# Patient Record
Sex: FEMALE | Race: OTHER | NOT HISPANIC OR LATINO | URBAN - METROPOLITAN AREA
[De-identification: names, ages, dates, MRNs, and addresses within clinical notes are randomized per-mention and may not be internally consistent; named-entity substitution may affect disease eponyms.]

---

## 2017-10-25 ENCOUNTER — EMERGENCY (EMERGENCY)
Facility: HOSPITAL | Age: 67
LOS: 1 days | Discharge: HOME | End: 2017-10-25
Attending: EMERGENCY MEDICINE | Admitting: EMERGENCY MEDICINE
Payer: SELF-PAY

## 2017-10-25 VITALS
TEMPERATURE: 98 F | SYSTOLIC BLOOD PRESSURE: 179 MMHG | DIASTOLIC BLOOD PRESSURE: 119 MMHG | WEIGHT: 190.04 LBS | OXYGEN SATURATION: 98 % | HEIGHT: 69 IN | HEART RATE: 115 BPM | RESPIRATION RATE: 19 BRPM

## 2017-10-25 VITALS
HEART RATE: 109 BPM | SYSTOLIC BLOOD PRESSURE: 151 MMHG | TEMPERATURE: 98 F | OXYGEN SATURATION: 98 % | RESPIRATION RATE: 18 BRPM | DIASTOLIC BLOOD PRESSURE: 96 MMHG
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
## 2017-10-25 DIAGNOSIS — Z90.49 ACQUIRED ABSENCE OF OTHER SPECIFIED PARTS OF DIGESTIVE TRACT: Chronic | ICD-10-CM

## 2017-10-25 DIAGNOSIS — S00.93XA CONTUSION OF UNSPECIFIED PART OF HEAD, INITIAL ENCOUNTER: ICD-10-CM

## 2017-10-25 DIAGNOSIS — R51 HEADACHE: ICD-10-CM
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
## 2017-10-25 DIAGNOSIS — Z90.89 ACQUIRED ABSENCE OF OTHER ORGANS: ICD-10-CM

## 2017-10-25 PROCEDURE — 70450 CT HEAD/BRAIN W/O DYE: CPT | Mod: 26

## 2017-10-25 PROCEDURE — 70450 CT HEAD/BRAIN W/O DYE: CPT

## 2017-10-25 PROCEDURE — 99284 EMERGENCY DEPT VISIT MOD MDM: CPT | Mod: 25

## 2017-10-25 PROCEDURE — 93010 ELECTROCARDIOGRAM REPORT: CPT

## 2017-10-25 RX ORDER — ACETAMINOPHEN 500 MG
650 TABLET ORAL ONCE
Qty: 0 | Refills: 0 | Status: COMPLETED | OUTPATIENT
Start: 2017-10-25 | End: 2017-10-25

## 2017-10-25 RX ADMIN — Medication 650 MILLIGRAM(S): at 17:16

## 2017-10-25 RX ADMIN — Medication 650 MILLIGRAM(S): at 18:07

## 2017-10-25 NOTE — ED PROVIDER NOTE - ATTENDING CONTRIBUTION TO CARE
Pt hx of anxiety s/p fall, was kneeling down, lost balance then hit her head. no loc, no neck pain/facial pain, ambulating after, no other injury. NAD, RRR, CTAB, no abdominal tenderness, no focal neuro deficits, no midline c/t/l spine tenderness. CT head obtained, wnl, Discussed with pt results of work up, strict return precautions, and need for follow up.  Pt expressed understanding and agrees with plan.

## 2017-10-25 NOTE — ED PROVIDER NOTE - PHYSICAL EXAMINATION
CONSTITUTIONAL: Well-appearing; anxious  HEAD: Normocephalic; +tenderness to L parietal area, minor swelling, no abrasions or laceration  EYES: PERRL; EOM intact; conjunctiva and sclera clear  ENT: normal nose; no rhinorrhea; normal pharynx with no erythema or lesions.   NECK: Supple; non-tender; no LAD, no midline tenderness   CARDIOVASCULAR: Normal S1, S2; no murmurs, rubs, or gallops. Regular rate and rhythm.   RESPIRATORY: Breathing easily; breath sounds clear and equal bilaterally; no wheezes, rhonchi, or rales.  GI: Soft; non-distended; non-tender; no palpable organomegaly.   MSK: FROM at all extremities, normal tone   EXT: No cyanosis or edema; N/V intact  SKIN: Normal for age and race; warm; dry; good turgor; no apparent lesions or rash.   NEURO: A & O x 3; face symmetric; grossly unremarkable. CONSTITUTIONAL: Well-appearing; anxious  HEAD: Normocephalic; +tenderness to L occipital area, minor swelling, no abrasions or laceration  EYES: PERRL; EOM intact; conjunctiva and sclera clear  ENT: normal nose; no rhinorrhea; normal pharynx with no erythema or lesions.   NECK: Supple; non-tender; no LAD, no midline tenderness   CARDIOVASCULAR: Normal S1, S2; no murmurs, rubs, or gallops. Regular rate and rhythm.   RESPIRATORY: Breathing easily; breath sounds clear and equal bilaterally; no wheezes, rhonchi, or rales.  GI: Soft; non-distended; non-tender; no palpable organomegaly.   MSK: FROM at all extremities, normal tone   EXT: No cyanosis or edema; N/V intact  SKIN: Normal for age and race; warm; dry; good turgor; no apparent lesions or rash.   NEURO: A & O x 3; face symmetric; grossly unremarkable.

## 2017-10-25 NOTE — ED ADULT NURSE NOTE - OBJECTIVE STATEMENT
66 y/o female c/o headache after hitting her head with a door. Pt states " I was vising someone upstairs and was bending down to say hello to a child, while I try to stand up I lost my balance and hit my head with the door". Pt denies LOC, blurred vision, cp. + bump to the left temporal area. no bleeding noted

## 2017-10-25 NOTE — ED PROVIDER NOTE - MEDICAL DECISION MAKING DETAILS
66 yo f with hx of anxiety with HA after hitting her head on a wood door. Neuro exam normal. 66 yo f with hx of anxiety with HA after hitting her head on a wood door. Neuro exam normal. r/o intracranial bleed.

## 2017-10-25 NOTE — ED PROVIDER NOTE - OBJECTIVE STATEMENT
68 yo F with pmh of anxiety 66 yo F with pmh of anxiety c/o HA after hitting her head on a wood door. 68 yo F with pmh of anxiety c/o HA after hitting her head on a wood door. Pt was kneeling down trying to set something out of the refrigerator when she lost her balance and hit her head on the wood door. Denies LOC. Pt admits to HA with mild nausea. Denies vomiting, dizziness, visual changes. cp, sob. Pt states she is extremely anxious about her injury. Denies asa or blood thinners.

## 2023-03-31 ENCOUNTER — NEW PATIENT (OUTPATIENT)
Dept: URBAN - METROPOLITAN AREA CLINIC 19 | Facility: CLINIC | Age: 73
End: 2023-03-31

## 2023-03-31 DIAGNOSIS — H35.411: ICD-10-CM

## 2023-03-31 DIAGNOSIS — H31.092: ICD-10-CM

## 2023-03-31 DIAGNOSIS — H43.811: ICD-10-CM

## 2023-03-31 PROCEDURE — 92134 CPTRZ OPH DX IMG PST SGM RTA: CPT

## 2023-03-31 PROCEDURE — 99204 OFFICE O/P NEW MOD 45 MIN: CPT

## 2023-03-31 PROCEDURE — 92201 OPSCPY EXTND RTA DRAW UNI/BI: CPT

## 2023-03-31 ASSESSMENT — TONOMETRY
OS_IOP_MMHG: 18
OD_IOP_MMHG: 16

## 2023-03-31 ASSESSMENT — VISUAL ACUITY
OS_CC: 20/25-2
OD_CC: 20/25

## 2023-04-28 ENCOUNTER — FOLLOW UP (OUTPATIENT)
Dept: URBAN - METROPOLITAN AREA CLINIC 19 | Facility: CLINIC | Age: 73
End: 2023-04-28

## 2023-04-28 DIAGNOSIS — H43.811: ICD-10-CM

## 2023-04-28 DIAGNOSIS — H35.411: ICD-10-CM

## 2023-04-28 DIAGNOSIS — H31.092: ICD-10-CM

## 2023-04-28 PROCEDURE — 92201 OPSCPY EXTND RTA DRAW UNI/BI: CPT

## 2023-04-28 PROCEDURE — 92134 CPTRZ OPH DX IMG PST SGM RTA: CPT

## 2023-04-28 PROCEDURE — 92014 COMPRE OPH EXAM EST PT 1/>: CPT

## 2023-04-28 ASSESSMENT — TONOMETRY
OD_IOP_MMHG: 10
OS_IOP_MMHG: 12

## 2023-04-28 ASSESSMENT — VISUAL ACUITY
OS_CC: 20/30
OD_CC: 20/25
OS_PH: 20/25

## 2024-11-18 ENCOUNTER — FOLLOW UP (OUTPATIENT)
Dept: URBAN - METROPOLITAN AREA CLINIC 19 | Facility: CLINIC | Age: 74
End: 2024-11-18

## 2024-11-18 DIAGNOSIS — H35.411: ICD-10-CM

## 2024-11-18 DIAGNOSIS — H43.811: ICD-10-CM

## 2024-11-18 DIAGNOSIS — H31.092: ICD-10-CM

## 2024-11-18 PROCEDURE — 92201 OPSCPY EXTND RTA DRAW UNI/BI: CPT

## 2024-11-18 PROCEDURE — 92134 CPTRZ OPH DX IMG PST SGM RTA: CPT | Mod: NC

## 2024-11-18 PROCEDURE — 92014 COMPRE OPH EXAM EST PT 1/>: CPT

## 2024-11-18 ASSESSMENT — VISUAL ACUITY
OD_CC: 20/30-1
OS_CC: 20/25

## 2024-11-18 ASSESSMENT — TONOMETRY
OD_IOP_MMHG: 12
OS_IOP_MMHG: 11